# Patient Record
Sex: FEMALE | Race: BLACK OR AFRICAN AMERICAN | NOT HISPANIC OR LATINO | Employment: UNEMPLOYED | RURAL
[De-identification: names, ages, dates, MRNs, and addresses within clinical notes are randomized per-mention and may not be internally consistent; named-entity substitution may affect disease eponyms.]

---

## 2021-03-31 ENCOUNTER — HOSPITAL ENCOUNTER (OUTPATIENT)
Dept: RADIOLOGY | Facility: HOSPITAL | Age: 61
Discharge: HOME | End: 2021-03-31
Attending: NURSE PRACTITIONER
Payer: COMMERCIAL

## 2021-03-31 DIAGNOSIS — M79.602 LEFT ARM PAIN: ICD-10-CM

## 2021-03-31 DIAGNOSIS — M54.2 CERVICALGIA: ICD-10-CM

## 2021-03-31 PROCEDURE — 72050 X-RAY EXAM NECK SPINE 4/5VWS: CPT | Mod: 26,,, | Performed by: RADIOLOGY

## 2021-03-31 PROCEDURE — 72050 XR CERVICAL SPINE COMPLETE 5 VIEW: ICD-10-PCS | Mod: 26,,, | Performed by: RADIOLOGY

## 2021-03-31 PROCEDURE — 73090 X-RAY EXAM OF FOREARM: CPT | Mod: 26,LT,, | Performed by: RADIOLOGY

## 2021-03-31 PROCEDURE — 73070 X-RAY EXAM OF ELBOW: CPT | Mod: TC,LT

## 2021-03-31 PROCEDURE — 73070 X-RAY EXAM OF ELBOW: CPT | Mod: 26,LT,, | Performed by: RADIOLOGY

## 2021-03-31 PROCEDURE — 72050 X-RAY EXAM NECK SPINE 4/5VWS: CPT | Mod: TC

## 2021-03-31 PROCEDURE — 73070 XR ELBOW 2 VIEWS LEFT: ICD-10-PCS | Mod: 26,LT,, | Performed by: RADIOLOGY

## 2021-03-31 PROCEDURE — 73090 XR FOREARM LEFT: ICD-10-PCS | Mod: 26,LT,, | Performed by: RADIOLOGY

## 2021-03-31 PROCEDURE — 73090 X-RAY EXAM OF FOREARM: CPT | Mod: TC,LT

## 2023-06-28 ENCOUNTER — HOSPITAL ENCOUNTER (OUTPATIENT)
Facility: HOSPITAL | Age: 63
Discharge: HOME | End: 2023-06-28
Attending: ANESTHESIOLOGY | Admitting: ANESTHESIOLOGY
Payer: MEDICARE

## 2023-06-28 ENCOUNTER — ANESTHESIA (OUTPATIENT)
Dept: PAIN MEDICINE | Facility: HOSPITAL | Age: 63
End: 2023-06-28
Payer: MEDICARE

## 2023-06-28 ENCOUNTER — ANESTHESIA EVENT (OUTPATIENT)
Dept: PAIN MEDICINE | Facility: HOSPITAL | Age: 63
End: 2023-06-28
Payer: MEDICARE

## 2023-06-28 VITALS
BODY MASS INDEX: 35.68 KG/M2 | DIASTOLIC BLOOD PRESSURE: 79 MMHG | SYSTOLIC BLOOD PRESSURE: 112 MMHG | OXYGEN SATURATION: 100 % | WEIGHT: 209 LBS | TEMPERATURE: 98 F | HEART RATE: 74 BPM | RESPIRATION RATE: 14 BRPM | HEIGHT: 64 IN

## 2023-06-28 DIAGNOSIS — M54.16 LUMBAR RADICULOPATHY, CHRONIC: ICD-10-CM

## 2023-06-28 LAB — GLUCOSE SERPL-MCNC: 171 MG/DL (ref 70–105)

## 2023-06-28 PROCEDURE — 25500020 PHARM REV CODE 255: Performed by: ANESTHESIOLOGY

## 2023-06-28 PROCEDURE — 27000284 HC CANNULA NASAL: Performed by: NURSE ANESTHETIST, CERTIFIED REGISTERED

## 2023-06-28 PROCEDURE — D9220A PRA ANESTHESIA: Mod: ,,, | Performed by: NURSE ANESTHETIST, CERTIFIED REGISTERED

## 2023-06-28 PROCEDURE — 64483 NJX AA&/STRD TFRM EPI L/S 1: CPT | Mod: 50 | Performed by: ANESTHESIOLOGY

## 2023-06-28 PROCEDURE — 25000003 PHARM REV CODE 250: Performed by: NURSE ANESTHETIST, CERTIFIED REGISTERED

## 2023-06-28 PROCEDURE — D9220A PRA ANESTHESIA: ICD-10-PCS | Mod: ,,, | Performed by: NURSE ANESTHETIST, CERTIFIED REGISTERED

## 2023-06-28 PROCEDURE — 37000008 HC ANESTHESIA 1ST 15 MINUTES: Performed by: ANESTHESIOLOGY

## 2023-06-28 PROCEDURE — 82962 GLUCOSE BLOOD TEST: CPT

## 2023-06-28 PROCEDURE — 25000003 PHARM REV CODE 250: Performed by: ANESTHESIOLOGY

## 2023-06-28 PROCEDURE — 63600175 PHARM REV CODE 636 W HCPCS: Performed by: NURSE ANESTHETIST, CERTIFIED REGISTERED

## 2023-06-28 PROCEDURE — 63600175 PHARM REV CODE 636 W HCPCS: Performed by: ANESTHESIOLOGY

## 2023-06-28 RX ORDER — DICYCLOMINE HYDROCHLORIDE 10 MG/1
10 CAPSULE ORAL DAILY PRN
COMMUNITY

## 2023-06-28 RX ORDER — LEVOTHYROXINE SODIUM 50 UG/1
TABLET ORAL
COMMUNITY
Start: 2023-05-08

## 2023-06-28 RX ORDER — BUPIVACAINE HYDROCHLORIDE 2.5 MG/ML
INJECTION, SOLUTION INFILTRATION; PERINEURAL CODE/TRAUMA/SEDATION MEDICATION
Status: DISCONTINUED | OUTPATIENT
Start: 2023-06-28 | End: 2023-06-28 | Stop reason: HOSPADM

## 2023-06-28 RX ORDER — PROPOFOL 10 MG/ML
VIAL (ML) INTRAVENOUS
Status: DISCONTINUED | OUTPATIENT
Start: 2023-06-28 | End: 2023-06-28

## 2023-06-28 RX ORDER — SODIUM CHLORIDE 9 MG/ML
500 INJECTION, SOLUTION INTRAVENOUS CONTINUOUS
Status: DISCONTINUED | OUTPATIENT
Start: 2023-06-28 | End: 2023-06-28 | Stop reason: HOSPADM

## 2023-06-28 RX ORDER — GABAPENTIN 300 MG/1
300 CAPSULE ORAL 2 TIMES DAILY
COMMUNITY
Start: 2023-05-08

## 2023-06-28 RX ORDER — METHOTREXATE 2.5 MG/1
TABLET ORAL
COMMUNITY
Start: 2023-04-12

## 2023-06-28 RX ORDER — LIDOCAINE HYDROCHLORIDE 20 MG/ML
INJECTION, SOLUTION EPIDURAL; INFILTRATION; INTRACAUDAL; PERINEURAL
Status: DISCONTINUED | OUTPATIENT
Start: 2023-06-28 | End: 2023-06-28

## 2023-06-28 RX ORDER — RISPERIDONE 4 MG/1
TABLET ORAL
COMMUNITY
Start: 2023-06-21

## 2023-06-28 RX ORDER — CYCLOBENZAPRINE HCL 10 MG
TABLET ORAL
COMMUNITY
Start: 2023-06-16

## 2023-06-28 RX ORDER — METOPROLOL TARTRATE 25 MG/1
TABLET, FILM COATED ORAL
COMMUNITY
Start: 2023-05-08

## 2023-06-28 RX ORDER — GLIPIZIDE 10 MG/1
TABLET, FILM COATED, EXTENDED RELEASE ORAL
COMMUNITY
Start: 2023-05-08

## 2023-06-28 RX ORDER — CITALOPRAM 20 MG/1
TABLET, FILM COATED ORAL
COMMUNITY
Start: 2023-06-21

## 2023-06-28 RX ORDER — IOPAMIDOL 612 MG/ML
INJECTION, SOLUTION INTRATHECAL CODE/TRAUMA/SEDATION MEDICATION
Status: DISCONTINUED | OUTPATIENT
Start: 2023-06-28 | End: 2023-06-28 | Stop reason: HOSPADM

## 2023-06-28 RX ORDER — MELOXICAM 15 MG/1
15 TABLET ORAL
COMMUNITY
Start: 2023-06-16

## 2023-06-28 RX ORDER — HYDROCODONE BITARTRATE AND ACETAMINOPHEN 10; 325 MG/1; MG/1
TABLET ORAL
COMMUNITY
Start: 2023-06-16

## 2023-06-28 RX ORDER — SITAGLIPTIN 50 MG/1
TABLET, FILM COATED ORAL
COMMUNITY
Start: 2023-05-08

## 2023-06-28 RX ORDER — METFORMIN HYDROCHLORIDE 1000 MG/1
TABLET ORAL
COMMUNITY
Start: 2023-05-08

## 2023-06-28 RX ORDER — POTASSIUM CHLORIDE 750 MG/1
CAPSULE, EXTENDED RELEASE ORAL
COMMUNITY
Start: 2023-05-08

## 2023-06-28 RX ORDER — AMLODIPINE BESYLATE 5 MG/1
TABLET ORAL
COMMUNITY
Start: 2023-05-08

## 2023-06-28 RX ORDER — CYCLOSPORINE 0.5 MG/ML
1 EMULSION OPHTHALMIC 2 TIMES DAILY
COMMUNITY
Start: 2023-05-10

## 2023-06-28 RX ORDER — TRIAMCINOLONE ACETONIDE 40 MG/ML
INJECTION, SUSPENSION INTRA-ARTICULAR; INTRAMUSCULAR CODE/TRAUMA/SEDATION MEDICATION
Status: DISCONTINUED | OUTPATIENT
Start: 2023-06-28 | End: 2023-06-28 | Stop reason: HOSPADM

## 2023-06-28 RX ADMIN — PROPOFOL 50 MG: 10 INJECTION, EMULSION INTRAVENOUS at 02:06

## 2023-06-28 RX ADMIN — SODIUM CHLORIDE: 9 INJECTION, SOLUTION INTRAVENOUS at 01:06

## 2023-06-28 RX ADMIN — PROPOFOL 50 MG: 10 INJECTION, EMULSION INTRAVENOUS at 01:06

## 2023-06-28 RX ADMIN — LIDOCAINE HYDROCHLORIDE 50 MG: 20 INJECTION, SOLUTION INTRAVENOUS at 01:06

## 2023-06-28 NOTE — ANESTHESIA PREPROCEDURE EVALUATION
06/28/2023  Venita Regalado is a 62 y.o., female.      Pre-op Assessment    I have reviewed the Patient Summary Reports.     I have reviewed the Nursing Notes. I have reviewed the NPO Status.   I have reviewed the Medications.     Review of Systems  Anesthesia Hx:  No problems with previous Anesthesia        Physical Exam  General: Well nourished, Cooperative, Alert and Oriented    Airway:  Mallampati: II   Mouth Opening: Normal  TM Distance: Normal  Tongue: Normal  Neck ROM: Normal ROM    Dental:  Intact        Anesthesia Plan  Type of Anesthesia, risks & benefits discussed:    Anesthesia Type: Gen Natural Airway, MAC  Intra-op Monitoring Plan: Standard ASA Monitors  Post Op Pain Control Plan: multimodal analgesia  Induction:  IV  Informed Consent: Informed consent signed with the Patient and all parties understand the risks and agree with anesthesia plan.  All questions answered. Patient consented to blood products? Yes  ASA Score: 3  Day of Surgery Review of History & Physical: I have interviewed and examined the patient. I have reviewed the patient's H&P dated: There are no significant changes.     Ready For Surgery From Anesthesia Perspective.     .   Past Medical History:   Diagnosis Date    Arthritis     Atrial fibrillation     Diabetes mellitus     GERD (gastroesophageal reflux disease)     Hypertension     Thyroid disease        History reviewed. No pertinent surgical history.    Family History   Problem Relation Age of Onset    Stroke Mother     Heart disease Father        Social History     Socioeconomic History    Marital status: Single   Tobacco Use    Smoking status: Never    Smokeless tobacco: Never   Substance and Sexual Activity    Alcohol use: Not Currently       Current Facility-Administered Medications   Medication Dose Route Frequency Provider Last Rate Last Admin    0.9%   NaCl infusion  500 mL Intravenous Continuous Erasmo Peralta MD           Review of patient's allergies indicates:  No Known Allergies There is no problem list on file for this patient.

## 2023-06-28 NOTE — DISCHARGE SUMMARY
Patient underwent Bilateral L4-L5 Transforaminal Epidural Steroid Injection under Fluoroscopic Guidance  procedure /2023. The pt will follow up in clinic. Discharged home. Discharge Dx:  Lumbar Radiculopathy  
SHORTNESS OF BREATH/CHEST PAIN

## 2023-06-28 NOTE — H&P
No changes from original H & P note dated 06/21/2023 and has been reviewed and is valid. See scanned note.

## 2023-06-28 NOTE — OP NOTE
06/28/2023  PREOPERATIVE DIAGNOSIS:     Lumbar Radiculopathy         POSTOPERATIVE DIAGNOSIS:   Lumbar Radiculopathy         PROCEDURE:  Bilateral L4-L5 Transforaminal Epidural Steroid Injection under Fluoroscopic Guidance         SURGEON: Dr. Erasmo Peralta    COMPLICATIONS:  None                                          DRAINS AND PACKS: None    BLOOD LOSS: None                                                               ANESTHESIA:  MAC         The patient was identified in the holding area.  The risks and benefits of the procedure were again explained to the patient.  The patient agreed to proceed.  The back was marked with a skin pen.  The patient was taken in stable condition to the operating room where the patient was placed in prone position on the C-Arm table.    All pressure points were checked and padded comfortably while the patient was awake.  Standard ASA monitors applied.  Time out was completed.  The back was prepped and draped in the usual sterile fashion. The C-Arm was brought into true AP position to identify the vertebral bodies from L1 to over the L4-L5 level on the left side.  The skin wheal  was raised over the targeted area with Bupivacaine 0.25% (2.5mg/ml) 1 cc.  A 22 gauge 3 ½ inch needle was advanced under direct fluoroscopic guidance to the superior aspect of the neural foramen and was confirmed in both AP and lateral views.  A 1 milliliter allotment Isovue M 300 contrast showed excellent delineation along the L4 nerve root with some spread going medially into the epidural space.  The patient received  [id number]/ml  1ml mixed with 2 milliliters of 0.25% Bupivacaine was injected.  Stylet was removed.  There was negative aspiration.  There was adequate hemostasis at the conclusion of the procedure.  The procedure was repeated on the right as described above. The patient tolerated the procedure well with no adverse event and no complications.  The patient was discharged home under the  care of the patients .  The patients preoperative score 10/10.  The patients postoperative pain score was /10.

## 2023-06-28 NOTE — TRANSFER OF CARE
"Anesthesia Transfer of Care Note    Patient: Venita Regalado    Procedure(s) Performed: Procedure(s) (LRB):  INJECTION, STEROID, EPIDURAL, TRANSFORAMINAL APPROACH (Bilateral)    Patient location: Other:    Anesthesia Type: general    Transport from OR: Transported from OR on room air with adequate spontaneous ventilation    Post pain: adequate analgesia    Post assessment: no apparent anesthetic complications    Post vital signs: stable    Level of consciousness: responds to stimulation    Nausea/Vomiting: no nausea/vomiting    Complications: none    Transfer of care protocol was followed      Last vitals:   Visit Vitals  /80   Pulse 80   Temp 36.8 °C (98.2 °F)   Resp 15   Ht 5' 4" (1.626 m)   Wt 94.8 kg (209 lb)   SpO2 97%   BMI 35.87 kg/m²     "

## 2023-06-28 NOTE — DISCHARGE INSTRUCTIONS
REFER TO WRITTEN DOCUMENT AND RECOVERY INFORMATION.        INFORMED PATIENT IF UNABLE TO VOID IN 8 HOURS, GO TO ER. NOTIFY MD OF REDNESS OR DRAINAGE FROM INJECTION SITE OR FEVER OVER 3-4 DAY. REST AND DRINK PLENTY OF FLUIDS FOR THE REMAINDER OF THE DAY. NO LIFTING OVER 5 LBS FOR THE REMAINDER OF THE DAY. CONTINUE REGULAR MEDICATIONS AS PRESCRIBED. MAY TAKE PAIN MEDICATION AS PRESCRIBED.        well developed

## 2023-06-29 NOTE — ANESTHESIA POSTPROCEDURE EVALUATION
Anesthesia Post Evaluation    Patient: Venita Regalado    Procedure(s) Performed: Procedure(s) (LRB):  INJECTION, STEROID, EPIDURAL, TRANSFORAMINAL APPROACH (Bilateral)    Final Anesthesia Type: general      Patient location: [facility].  Patient participation: Yes- Able to Participate  Level of consciousness: awake and alert  Post-procedure vital signs: reviewed and stable  Pain management: adequate  Airway patency: patent    PONV status at discharge: No PONV  Anesthetic complications: no      Cardiovascular status: blood pressure returned to baseline, hemodynamically stable and stable  Respiratory status: unassisted  Hydration status: euvolemic  Follow-up not needed.  Comments: Pt voices appreciation for care          Vitals Value Taken Time   /77 06/28/23 1444   Temp 36.8 °C (98.2 °F) 06/28/23 1411   Pulse 76 06/28/23 1445   Resp 12 06/28/23 1445   SpO2 100 % 06/28/23 1445   Vitals shown include unvalidated device data.      Event Time   Out of Recovery 14:41:00         Pain/Peter Score: Peter Score: 10 (6/28/2023  2:40 PM)

## 2024-02-19 NOTE — PLAN OF CARE
REFER TO WRITTEN DOCUMENT AND RECOVERY INFORMATION.    D/CD PATIENT [facility]AA WHEELCHAIR AT 1500.    INFORMED PATIENT IF UNABLE TO VOID IN 8 HOURS, GO TO ER. NOTIFY MD OF REDNESS OR DRAINAGE FROM INJECTION SITE OR FEVER OVER 3-4 DAY. REST AND DRINK PLENTY OF FLUIDS FOR THE REMAINDER OF THE DAY. NO LIFTING OVER 5 LBS FOR THE REMAINDER OF THE DAY. CONTINUE REGULAR MEDICATIONS AS PRESCRIBED. MAY TAKE PAIN MEDICATION AS PRESCRIBED.     PAIN IMPROVED  100%    catheter advanced into LV.

## 2024-10-02 ENCOUNTER — ANESTHESIA EVENT (OUTPATIENT)
Dept: PAIN MEDICINE | Facility: HOSPITAL | Age: 64
End: 2024-10-02
Payer: MEDICARE

## 2024-10-02 ENCOUNTER — ANESTHESIA (OUTPATIENT)
Dept: PAIN MEDICINE | Facility: HOSPITAL | Age: 64
End: 2024-10-02
Payer: MEDICARE

## 2024-10-02 ENCOUNTER — HOSPITAL ENCOUNTER (OUTPATIENT)
Facility: HOSPITAL | Age: 64
Discharge: HOME | End: 2024-10-02
Attending: ANESTHESIOLOGY | Admitting: ANESTHESIOLOGY
Payer: MEDICARE

## 2024-10-02 VITALS
OXYGEN SATURATION: 97 % | HEART RATE: 79 BPM | WEIGHT: 194.38 LBS | HEIGHT: 64 IN | BODY MASS INDEX: 33.19 KG/M2 | SYSTOLIC BLOOD PRESSURE: 141 MMHG | RESPIRATION RATE: 13 BRPM | DIASTOLIC BLOOD PRESSURE: 78 MMHG | TEMPERATURE: 99 F

## 2024-10-02 DIAGNOSIS — M54.16 LUMBAR RADICULOPATHY, CHRONIC: ICD-10-CM

## 2024-10-02 LAB — GLUCOSE SERPL-MCNC: 101 MG/DL (ref 70–105)

## 2024-10-02 PROCEDURE — 64483 NJX AA&/STRD TFRM EPI L/S 1: CPT | Mod: 50 | Performed by: ANESTHESIOLOGY

## 2024-10-02 PROCEDURE — 25000003 PHARM REV CODE 250: Performed by: ANESTHESIOLOGY

## 2024-10-02 PROCEDURE — 63600175 PHARM REV CODE 636 W HCPCS: Performed by: ANESTHESIOLOGY

## 2024-10-02 PROCEDURE — 27000716 HC OXISENSOR PROBE, ANY SIZE

## 2024-10-02 PROCEDURE — 25500020 PHARM REV CODE 255: Performed by: ANESTHESIOLOGY

## 2024-10-02 PROCEDURE — 63600175 PHARM REV CODE 636 W HCPCS

## 2024-10-02 PROCEDURE — 27000284 HC CANNULA NASAL

## 2024-10-02 PROCEDURE — 37000008 HC ANESTHESIA 1ST 15 MINUTES: Performed by: ANESTHESIOLOGY

## 2024-10-02 PROCEDURE — 82962 GLUCOSE BLOOD TEST: CPT

## 2024-10-02 RX ORDER — IOPAMIDOL 612 MG/ML
INJECTION, SOLUTION INTRATHECAL CODE/TRAUMA/SEDATION MEDICATION
Status: DISCONTINUED | OUTPATIENT
Start: 2024-10-02 | End: 2024-10-02 | Stop reason: HOSPADM

## 2024-10-02 RX ORDER — LIDOCAINE HYDROCHLORIDE 20 MG/ML
INJECTION INTRAVENOUS
Status: DISCONTINUED | OUTPATIENT
Start: 2024-10-02 | End: 2024-10-02

## 2024-10-02 RX ORDER — TRIAMCINOLONE ACETONIDE 40 MG/ML
INJECTION, SUSPENSION INTRA-ARTICULAR; INTRAMUSCULAR CODE/TRAUMA/SEDATION MEDICATION
Status: DISCONTINUED | OUTPATIENT
Start: 2024-10-02 | End: 2024-10-02 | Stop reason: HOSPADM

## 2024-10-02 RX ORDER — SODIUM CHLORIDE 9 MG/ML
500 INJECTION, SOLUTION INTRAVENOUS CONTINUOUS
Status: DISCONTINUED | OUTPATIENT
Start: 2024-10-02 | End: 2024-10-02 | Stop reason: HOSPADM

## 2024-10-02 RX ORDER — BUPIVACAINE HYDROCHLORIDE 2.5 MG/ML
INJECTION, SOLUTION INFILTRATION; PERINEURAL CODE/TRAUMA/SEDATION MEDICATION
Status: DISCONTINUED | OUTPATIENT
Start: 2024-10-02 | End: 2024-10-02 | Stop reason: HOSPADM

## 2024-10-02 RX ORDER — PROPOFOL 10 MG/ML
VIAL (ML) INTRAVENOUS
Status: DISCONTINUED | OUTPATIENT
Start: 2024-10-02 | End: 2024-10-02

## 2024-10-02 RX ADMIN — PROPOFOL 20 MG: 10 INJECTION, EMULSION INTRAVENOUS at 11:10

## 2024-10-02 RX ADMIN — PROPOFOL 100 MG: 10 INJECTION, EMULSION INTRAVENOUS at 11:10

## 2024-10-02 RX ADMIN — PROPOFOL 40 MG: 10 INJECTION, EMULSION INTRAVENOUS at 11:10

## 2024-10-02 RX ADMIN — LIDOCAINE HYDROCHLORIDE 100 MG: 20 INJECTION, SOLUTION INTRAVENOUS at 11:10

## 2024-10-02 RX ADMIN — SODIUM CHLORIDE: 9 INJECTION, SOLUTION INTRAVENOUS at 11:10

## 2024-10-02 NOTE — OP NOTE
10/02/2024  Venita Regalado 1960      PREOPERATIVE DIAGNOSIS:     Lumbar Radiculopathy     POSTOPERATIVE DIAGNOSIS:   Lumbar Radiculopathy     PROCEDURE:  Bilateral L4-L5 Transforaminal Epidural Steroid Injection under Fluoroscopic Guidance     SURGEON: Dr. Erasmo Peralta  COMPLICATIONS:  None                                        DRAINS AND PACKS: None  BLOOD LOSS: None                                                             ANESTHESIA:  MAC     The patient was identified in the holding area.  The risks and benefits of the procedure were again explained to the patient.  The patient agreed to proceed.  The back was marked with a skin pen.  The patient was taken in stable condition to the operating room where the patient was placed in prone position on the C-Arm table.    All pressure points were checked and padded comfortably while the patient was awake.  Standard ASA monitors applied.  Time out was completed.  The back was prepped and draped in the usual sterile fashion. The C-Arm was brought into true AP position to identify the vertebral bodies from L1 to over the L4-L5 level on the left side.  The skin wheal  was raised over the targeted area with Bupivacaine 0.25% (2.5mg/ml) 1 cc.  A 22 gauge 3 ½ inch needle was advanced under direct fluoroscopic guidance to the superior aspect of the neural foramen and was confirmed in both AP and lateral views.  A 1 milliliter allotment of Isovue M 300 contrast showed excellent delineation along the L4 nerve root with some spread going medially into the epidural space.  The patient received  [id number]/ml 1/2 ml mixed with 2 milliliters of 0.25% Bupivacaine was injected.  Stylet was removed.  There was negative aspiration.  There was adequate hemostasis at the conclusion of the procedure.  The procedure was repeated on the right as described above. The patient tolerated the procedure well with no adverse event and no complications.  The patient was discharged  home under the care of the patients .  The patients preoperative score 8/10.  The patients postoperative pain score was /10.

## 2024-10-02 NOTE — H&P
"[facility] - Pain Management  Pain Management  H&P    Patient Name: Venita Regalado  MRN: [id number]  Admission Date: 10/2/2024  Primary Care Provider: Albertina Bermudez FNP    Patient information was obtained from     Subjective:     Principal Problem:     Albertina Bermudez FNP  [address]  [place] [address]  [phone]                   RE: Venita Regalado      : 1960   Date of Service: 2024   Existing Patient           Chief Complaint:   Hip pain left; characterized as dull pain , aching aggravated by walking standing ; relieved by rest, narcotics; gradual in onset, constant.   Patient seated in 7 with c/o left hip and low back pain, reports pain is unchanged since last visit      History of Present Illness:   What part of the body? left hip and low back   Pain level at best 1; Pain level at worst 7; Pain level at present 2; Pain level on average 4   65 y/o with complaints of "low back pain"; objective data essentially unchanged from previous visit; she has missed her last appt due to miscommunication issues and has been out of meds a few days; she is now having more R side neck pain that radiates into R hand with numbness and tingling and is also having more L side back pain that radiates into L leg with numbness and tingling; states that pain improved by 100% after Bilateral L5-S1 TFESI #3 in Nov that lasted for several months but has started having some slight pain to Left lower back that radiates into hip and leg;  overall, pain has been worse since last visit; denies any recent falls; also states that pain to her Left side that radiates into leg with numbness and tingling has also improved with injection; she was able to have her last Bilateral L4-L5 TFESI #2 that improved pain by 95% that lasted for about 4 months and is ready to schedule her next injection; she did have some elevated blood sugar after this last injection going to 500 that resulted in an ER visit and " had a med change but has normalized at this point; she has been diagnosed with bone spurs and was seen by ortho and was able to wear a boot that allowed foot to heal; states that pain improved by 100% after Bilateral L4-L5 TFESI #1 in Nov 2022 and is still having good pain relief; overall, she is doing good with pain control; she has not had an appt with Dr. Juarez in [place]  for knee pain and had IA injections to both knees since last visit but this is helping better with pain control but needs to reschedule an additional appt; states that pain improved by 90% after cath guided LACHELLE C5-C6 #1 that is still doing ok with pain control; neck pain has been better along with the numbness to both arms; states that neck pain has been better since procedure as well as the tingling to L arm; she was able to have her Cspine MRI that revealed changes; states that pain improved by 100% after Bilateral L4-L5 TFESI #1 in April 2021 that lasted for over a year with pain relief and needs an additional injection scheduled; heat helps with this pain; states that pain improved by 100% after her Left L4-S1 TFESI #1 in Dec 2019; pain only improved by 30% at this point after her Left SI joint RFTC; the TFESI also helped with lower back pain as well as L leg pain; states that pain to lower back that radiates into L leg with numbness and tingling has improved with previous injections but has been over a year since she has had one done; she has had surgery on the R knee in the past; states that pain improved by 100% after her Left SI joint RFTC in Nov 2018 that improved pain; states that pain improved by 100 % after Left SI injection #2 for a about a week then the pain slowly returned. She had about 90% after her Left SI joint injection  #1 that lasted several weeks. overall, pain is controlled with meds and rest; States that pain improved 100% after her Left L3-L5 RFTC in April 2018 and is still doing good at this point; states that  she had 100% improvement of pain after her #1 and #2 Left L3-S1 that lasted about a month; states that pain is unchanged from previous visit and states that meds help with her pain; feels like R knee pain is worse from previous visit and she is having more joint pain and had the Bilateral Knee xrays that revealed some changes but she has been seen by Ortho in [place]; she has had knee injections in the past that helps with knee pain but has also had a scope in the past as well by [facility]; She defers any change in treatment plan at this time; denies any issue with constipation; procedures in the past helped with her pain; she is a diabetic but blood sugars remain in the 90's and low 100's--in the past she has had injections without much elevation     UDS: consistent x 21; inconsistent x 6  (late picking up rx) UDS due today  The previous urine drug screen was evaluated, and it was compliant for the medications that has been prescribed. A presumptive urine drug screen was done today to rapidly obtain and integrate results into clinical assessment and decision-making for ongoing safe prescribing of controlled substances. The results of the presumptive UDS done last visit. She is prescribed hydrocodone. Because presumptive UDS positive results are not definitive due to sensitivity and specificity and cross reactivity limitations and negative results do not necessarily indicate absence of drugs or substances in the urine specimen, confirmation will identify specific prescribed and non-prescribed medications or illicit use for ongoing safe prescribing of controlled substances including benzodiazepines, opioid agonist, opioids antagonist, partial agonist, stimulants, muscle relaxers, antidepressants, sleep aids, anti-seizure medicine, and alcohol. Urine drug analysis is used to assist with diagnosis and therapeutic decision-making concerning pretreatment assessment. Intensity and frequency of monitoring with  urine drug testing will be based on the risk stratification method in determining risk level for opioid addiction.     Meds: Norco--due today; requests meds to be refilled and states that meds help with pain; no misuse of meds and no opioid abuse;  reviewed and is appropriate; CDC guidelines discussed with pt and voiced understanding; she is currently prescribed 30 mg of morphine equivalent meds/day but does not take every day      Nursing:   Pain Medication/Dose/Last Taken/# Taken  Norco 10-325mg  pain level with medication is a 2/10 and without is a 10/10  no Physical Therapy  Home Exercises  no New medical problems or surgeries  no New medications  no New allergies  no New antibiotics, fever, infection      Allergies:   No Known Allergies Confirmed - 09/19/24 10:55:11 AM CST       Current Medications:   Medications List Reviewed (09/19/24 10:55:08 AM CST)  HYDROcodone-Acetaminophen Oral Tablet  MG (9/19/2024) Take 1 tablet four times a day as needed for 30 day(s)  Dexamethasone Sodium Phosphate Injection Solution 4 MG/ML (5/28/2024) From 05/28/2024 10:30 AM to 05/28/2024 10:45 AM  Dexamethasone Sodium Phosphate Injection Solution 100 MG/10ML (9/11/2019) From 09/11/2019 11:25 AM to 09/11/2019 11:40 AM  Ketorolac Tromethamine Injection Solution 60 MG/2ML (5/28/2024) From 05/28/2024 10:30 AM to 05/28/2024 10:45 AM  Dexamethasone Sodium Phosphate Injection Solution 10 MG/ML (12/20/2018) From 12/20/2018 12:07 PM  Medrol Oral Tablet Therapy Pack 4 MG (12/7/2018) Take as directed  RaNITidine HCl Oral Tablet 150 MG (8/14/2018) Take 1 tablet once a day  Simvastatin Oral Tablet 40 MG (3/14/2017)  MetFORMIN HCl Oral Tablet 500 MG (3/14/2017)  Levothyroxine Sodium Oral Tablet 75 MCG (3/14/2017)  Ferrous Fumarate Oral Tablet 325 (106 Fe) MG (3/14/2017)  Benicar Oral Tablet 40 MG (3/14/2017)  Cyclobenzaprine HCl Oral Tablet 10 MG (5/28/2024) Take 1 tablet three times a day as needed for 30 day(s)      Previous  Studies:  MRI MRI R knee at [facility]    Impression:    1. Horizontal tear of tne body of lateral meniscus with an 11x7 mm adjacent parameniscal cyst. Reactive soft tissue edema at the lateral knee    2. mild tricompartmental DJD, slightly more prominent at the lateral femorotibial and patellofemoral compartments    3. A 1 cm intra-articular body along jacquelin posterior margin of the distal posterior cuciate ligament    4. small knee joint effusion with synovitis                [facility]spine [facility]    10/15/2019    Impression:    1. Moderate bilateral subarticular stenosis at L4-L5 with mild impingement on traversing bilateral L5 nerve roots. This is slightly progressed from comparison exam    2. Central annular fissure at L4-L5, similar to prior    3. Additional multilevel degenerative changes of the lumber spine as described in detail above, predominantly facet degenerative changes        MRI Cspine at [facility]    2021    Impression:    minimal left foraminal spondylosisof C5-C6 producing mild left lateral recess/foraminal narrowing                     ; X-RAY  Final Report  CR CR KNEE BILATERAL 1 OR 2 VIEWS    Show Printer-Friendly Version Patient Name: Venita Regalado   :    ID: [id number]([facility])   Study Date:  11:49             2 views of both knees obtained History is chronic bilateral knee pain There are miniscule osteophytes bilaterally more pronounced on the right No acute fracture or aggressive periosteal reaction is seen There is a 3 mm calcification overlying the region of the origin of the MCL in the right knee. This could be related to remote MCL injury area There are 2 calcifications which are chronic and corticated posterior to right knee measuring up to 10 mm which are present on the prior study. One of these is the flabella are regular could be a loose body. Impression- Minimal osteophytes bilaterally with a  possible loose body in the right knee Place of service- [facility] This report has been electronically signed by Cherri Montes - KAYLEE Logan PhysicianHaydee MONTES M.D. Releasing Physician- CHERRI MONTES M.D. Released Date Time- 18 1249 ------------------------------------------------------------------------------    Xray Cspine and left F/A at [facility] 2021  Impression:  no acute osseous pathology on l arm  and normal cervical spine radiographs        Past Medical History:   The patient has a past medical history of  Hypertension, Diabetes Mellitus type 2, Atrial Fibrillation, Depression, Obesity, Anxiety, Osteoarthritis (OA), Arthritis, Thyroid Disease.  There is no past medical history of  Congestive Heart Failure, Cardiovascular Disease, Terminal Illness.   nutritional quality good      Surgical History:      No prior surgical history      Social History:      Smoking Status: Never smoker; Last Reviewed: 2024                        Alcohol use: Non-Drinker  Racial background:   Occupation: disabled  Marital status: Single  Patient knows the purpose/use of medications as of 10/17/2017  Patient is taking medications as prescribed as of 10/17/2017               Family History:   Father  History remarkable for  Coronary Artery Disease.   Age 67;       Mother  History remarkable for  Cerebrovascular Accident/Transient Ischemic Attack.   Age 68;       Review of Systems:   General:  Patient denies  sweats, fatigue, fever, chills.  Ears, Nose and Throat:  Patient denies  hearing loss, ringing in the ears.  Cardiovascular:  Patient denies  chest pain.  Respiratory:  Patient denies  shortness of breath.  Gastrointestinal:  Patient denies  nausea, vomiting, diarrhea, constipation.  Genitourinary:  Patient denies  urinary frequency.  Endocrine:  Patient denies  thyroid problems.  Hematologic:  Patient denies  bleeding tendencies, easy bruising  tendency.  Musculoskeletal:   Patient admits to   joint pain.  Patient denies  walking aids.  Neurologic  Patient denies  seizures, headache.  Psychologic:   Patient admits to   depression.  Patient denies  anxiety, panic attacks.  Currently managed by [facility] in [place] for depression.  Skin:  Patient denies  skin rash.       DEPRESSION SCREENING:   Not at all the patient reports little interest or pleasure in doing things.  Not at all the patient reports feeling down, depressed, or hopeless.  Date Depression Screening Last Done: 01/09/2020   PHQ-2 Score: 0; PHQ-9 Score: incomplete   Date Depression Screening Last Done: 08/13/2019      Vital Signs:   Weight 215 lbs; Height 5 ft 4 in; BMI 36.9   09/19/2024 10:51 AM (CST)  Temperature 97.9 °F; Respiration Rate 18; Pulse Rate 81 bpm; Blood Pressure 108 / 71 mm/Hg; Pain Level: 10         Physical Examination:   Pre Anesthesia evaluation  Pre Op dx: cervical radiculopathy  Planned procedure: C5-C6 cath guided LACHELLE #1  Age: 61  Ht: 5 ft 4 in  Wt: 208 lbs  BMI:  Allergies: NKDA  Meds/Labs/Test  Prior Surgeries: B carpal tunnel, R rotator cuff, R knee arthroscopy, cholecystectomy, hysterectomy  Anethesia complications: NONE     Medical History  CNS: __Neg.   __Seizures  __CVA  __TIA  __HA  _X_Depression  Cardiac: __Neg  __CAD  __Stents  __MI  _X_HTN  __CHF __A Fib  Pulmonary: _X_Neg  __COPD  __Asthma  __Sleep Apnea  __Smoker PPD  __CPAP  GI:__Neg.  _X_Reflux  __Liver Dysfunction  __Hepatitis  __Hiatal Hernia  __Hepatitis  __ETOH  _X_GERD   Renal:  _X_Neg.  __CRI  __ESRD  Endocrine:  __Neg.  __Thyroid  _X_Diabetes  Heme:  _X_Neg.   __Blood thinners  __other     Cranial Nerves II-XII grossly intact.  No apparent distress.  Patient is alert and oriented to person, place, and time.  No somnolence or slurred speech.   Back Motion:   Lumbar / lumbosacral spine abnormal.      Tenderness on Palpation:   Lumbosacral Spine:  There is tenderness on palpation of the   left sciatic notch moderate in nature.         Additional Physical Findings:  General general appearance normal,   Oriented to time, place and person, pleasant, seated  Not uncomfortable  Head normal head exam  Eyes normal eye exam  Chest normal chest exam  Respiratory normal respiratory exam  Musculoskeletal abnormal,   Tenderness on palpation, muscle spasm, Knees abnormal, Feet abnormal, joint tenderness  R knee pain and swelling  Neurologic normal neurologic exam  Skin normal skin exam       Toxicology Report   Toxicology was performed.   Reason for Toxicology:  A presumptive urine drug screen was done today to rapidly obtain and integrate results into clinical assessment and decision-making for ongoing safe prescribing of controlled substances.   Test Date/Time: 09/19/2024 00:00   Tested By: NURA   Oxycodone  (OXY): Result = Negative; Control = Positive   Morphine  (OPI): Result = Positive; Control = Positive   Amphetamines  (AMP): Result = Negative; Control = Positive   Oxazepam  (BZO): Result = Negative; Control = Positive   Methadone  (MTD): Result = Negative; Control = Positive   Secobarbital  (BAR): Result = Negative; Control = Positive   Tricyclic Antidepressants  (TCA): Result = Negative; Control = Positive   Nortriptyline  (TCA): Result = Negative; Control = Positive   Marijuana-Carboxy Tetrahydrocannabinoid   (THC): Result = Negative; Control = Positive   Cocaine  (ROSALVA): Result = Negative; Control = Positive   Ecstasy-Methylenedioxymethamphetamine  (MDMA): Result = Negative; Control = Positive   D Methamphetamine  (MET): Result = Negative; Control = Positive   Phencyclidine  (PCP): Result = Negative; Control = Positive   Adulterants  (OX, SG, pH): Result = Negative; Control = Positive      Assessment:   (M19.90) - Osteoarthritis  (M25.569) - Knee pain  (M54.5) - Low back pain  (M53.3) - Disorder of sacrum  (M54.16) - Lumbar radiculopathy  (M47.817) - Lumbosacral spondylosis without myelopathy  (M23.359)  - Meniscus, lateral, posterior horn derangement  (M54.2) - Neck pain  (M54.12) - Cervical radiculopathy      Plan:   Follow up visit 6 weeks      -refilled Norco--due today  -gave rx for Oct with hold date 10/18/2024  (pharmacy closed on Sat)   -refilled Flexeril 10 mg TID prn with 5 RF to [facility]  (due again in Dec 2024)   -drink plenty of fluids and water  -increase fiber in diet  -scheduled Bilateral L4-L5 TFESI #1 at [facility] on 10/02/2024 at 10:15 am   -will consider cervical LACHELLE on return visit      Patient has a medical problem of intermediate acuity. The medical condition is not life threatening but poses a significant impact on morbidity and/or impacts the patients activities of daily living. This procedure is medically necessary to reduce pain and improve functionality.        Monitored Anesthesia Care medical necessity authorization request:   Monitor anesthesia request is medically indicated for the scheduled _TFESI_______procedure due to:     - needle phobia and anxiety, placing the patient at risk during the provided service._YES____  - patient has a BMI greater than 45 ____  - patient has severe sleep apnea for which BiPAP and oxygen are needed while sleeping._____  - patient is unable to follow simple commands due to mental state.____  - patient has an ASA class greater than 3 and requires constant presence of an anesthesiologist/CRNA during the procedure.____  - patient has severe problems with muscles and muscle spasticity that makes it hard to lie still. ____  - patient suffers from chronic pain and is unable to function due to diminished ADL's.__YES__  - patient is dependent on opioids or sedatives. _YES__   - Other _YES___    Due to the presence of lumbar radicular symptoms, we have elected to proceed with Lumbar transforaminal epidural steroid injections at the level confirmed by physical examination and accompanying studies. This is medically necessary to improve function, reduce pain  and limitations, and to reduce the reliance on pain medications. Additional epidural steroid injections will be based on patient improvement.     NARCOTIC STATEMENT  Patient is taking the narcotic pain medications as prescribed. Refill is being given because of the benefit to the patient in regards to the pain. Patient has agreed not to abuse of medication and not to take it more than what is prescribed. The nature of the drug including the potential for addiction and dependency and abuse was also discussed with the patient. Patient has developed physical dependency for the narcotic pain medication for his pain relief.  Patient has also developed tolerance to the sedative effect of the narcotic pain medications.  Patient has decided to continue with these medications despite potential for addiction as described by this office.  This was stressed to the patient that it is the patient's responsibility to secure the narcotic medication and in any event of loss for any reason whatsoever,  there will be no refill before the next due date. Patient also understands that they are not supposed to drive or work on machinery while taking these medications.  Also explained to the patient that in the event of traffic citation, the presence of this drug in  bloodstream may result in DUI.  Patient has been advised not to drink alcohol while taking this medication.  Patient has verbalized understanding of our office policy and has signed a contract with us in this regard.      Prescriptions Written Today:  HYDROcodone-Acetaminophen Oral Tablet  MG  Take 1 tablet four times a day as needed for 30 day(s)  Refills: No Refills  Rx quantity: 120  Take 1 tablet four times a day as needed for 30 day(s)  Refills: No Refills  Rx quantity: 120                 Albertina Bermudez       Electronically signed: 9/19/2024 1:13:40 PM      Erasmo Peralta MD      Electronically signed: 9/20/2024 9:06:12 AM           Chief Complaint:      HPI:        Assessment/Plan:         Erasmo Peralta MD  Pain Management  [facility] - Pain Management

## 2024-10-02 NOTE — DISCHARGE SUMMARY
Patient underwent  Bilateral L4-L5 Transforaminal Epidural Steroid Injection under Fluoroscopic Guidance   procedure 10/02/2024. The pt will follow up in clinic. Discharged home. Discharge Dx:Lumbar Radiculopathy

## 2024-10-02 NOTE — ANESTHESIA PREPROCEDURE EVALUATION
10/02/2024  Venita Regalado is a 64 y.o., female.  No current facility-administered medications on file prior to encounter.     Current Outpatient Medications on File Prior to Encounter   Medication Sig Dispense Refill    amLODIPine (NORVASC) 5 MG tablet       citalopram (CELEXA) 20 MG tablet       cyclobenzaprine (FLEXERIL) 10 MG tablet TAKE 1 TABLET THREE TIMES A DAY AS NEEDED FOR 30 DAY(S)      dicyclomine (BENTYL) 10 MG capsule Take 10 mg by mouth daily as needed.      gabapentin (NEURONTIN) 300 MG capsule Take 300 mg by mouth 2 (two) times a day.      glipiZIDE (GLUCOTROL) 10 MG TR24       HYDROcodone-acetaminophen (NORCO)  mg per tablet TAKE ONE TABLET 4 TIMES DAILY AS NEEDED FOR PAIN DNFU 6-16-23      JANUVIA 50 mg Tab       levothyroxine (SYNTHROID) 50 MCG tablet       meloxicam (MOBIC) 15 MG tablet Take 15 mg by mouth.      metFORMIN (GLUCOPHAGE) 1000 MG tablet       methotrexate 2.5 MG Tab       metoprolol tartrate (LOPRESSOR) 25 MG tablet       potassium chloride (MICRO-K) 10 MEQ CpSR       RESTASIS MULTIDOSE 0.05 % Drop Place 1 drop into both eyes 2 (two) times daily.      risperiDONE (RISPERDAL) 4 MG tablet       [DISCONTINUED] olmesartan (BENICAR) 40 MG tablet Take 40 mg by mouth once daily.      [DISCONTINUED] simvastatin (ZOCOR) 40 MG tablet Take 40 mg by mouth every evening.       Active Ambulatory Problems     Diagnosis Date Noted    No Active Ambulatory Problems     Resolved Ambulatory Problems     Diagnosis Date Noted    No Resolved Ambulatory Problems     Past Medical History:   Diagnosis Date    Arthritis     Atrial fibrillation     Diabetes mellitus     GERD (gastroesophageal reflux disease)     Hypertension     Thyroid disease      Past Surgical History:   Procedure Laterality Date    TRANSFORAMINAL EPIDURAL INJECTION OF STEROID Bilateral 6/28/2023    Procedure: INJECTION,  STEROID, EPIDURAL, TRANSFORAMINAL APPROACH;  Surgeon: Erasmo Peralta MD;  Location: [facility];  Service: Pain Management;  Laterality: Bilateral;  Bilateral L4-5 TFESI         Pre-op Assessment    I have reviewed the Patient Summary Reports.     I have reviewed the Nursing Notes. I have reviewed the NPO Status.   I have reviewed the Medications.     Review of Systems  Anesthesia Hx:  No problems with previous Anesthesia             Denies Family Hx of Anesthesia complications.    Denies Personal Hx of Anesthesia complications.                    Social:  Non-Smoker, No Alcohol Use       Hematology/Oncology:  Hematology Normal   Oncology Normal                                   EENT/Dental:  EENT/Dental Normal           Cardiovascular:  Exercise tolerance: good   Hypertension, well controlled    Dysrhythmias (NSR on monitor) atrial fibrillation                                   Pulmonary:  Pulmonary Normal                       Renal/:   renal calculi               Hepatic/GI:     GERD, well controlled             Musculoskeletal:  Arthritis               Neurological:  Neurology Normal                                      Endocrine:  Diabetes, well controlled, type 2 Hypothyroidism        Obesity / BMI > 30  Dermatological:  Skin Normal    Psych:  Psychiatric Normal                    Physical Exam  General: Well nourished, Cooperative, Alert and Oriented    Airway:  Mallampati: II   Mouth Opening: Normal  TM Distance: Normal  Tongue: Normal  Neck ROM: Normal ROM    Dental:  Dentures    Chest/Lungs:  Normal Respiratory Rate        Anesthesia Plan  Type of Anesthesia, risks & benefits discussed:    Anesthesia Type: MAC  Intra-op Monitoring Plan: Standard ASA Monitors  Post Op Pain Control Plan: multimodal analgesia  Induction:  IV  Informed Consent: Informed consent signed with the Patient and all parties understand the risks and agree with anesthesia plan.  All questions answered. Patient consented  to blood products? Yes  ASA Score: 2  Day of Surgery Review of History & Physical: H&P Update referred to the surgeon/provider.I have interviewed and examined the patient. I have reviewed the patient's H&P dated: There are no significant changes.     Ready For Surgery From Anesthesia Perspective.     .

## 2024-10-02 NOTE — TRANSFER OF CARE
"Anesthesia Transfer of Care Note    Patient: Venita Regalado    Procedure(s) Performed: Procedure(s) (LRB):  INJECTION, STEROID, EPIDURAL, TRANSFORAMINAL APPROACH (Bilateral)    Patient location: Other: [facility]    Anesthesia Type: MAC    Transport from OR: Transported from OR on room air with adequate spontaneous ventilation    Post pain: adequate analgesia    Post assessment: no apparent anesthetic complications    Post vital signs: stable    Level of consciousness: sedated and responds to stimulation    Nausea/Vomiting: no nausea/vomiting    Complications: none    Transfer of care protocol was followedComments: Good SV continue, NAD noted, VSS, RTRN      Last vitals: Visit Vitals  /66   Pulse 82   Temp 37.3 °C (99.1 °F) (Oral)   Resp 16   Ht 5' 4" (1.626 m)   Wt 88.2 kg (194 lb 6.4 oz)   SpO2 99%   BMI 33.37 kg/m²     "

## 2024-10-02 NOTE — ANESTHESIA POSTPROCEDURE EVALUATION
Anesthesia Post Evaluation    Patient: Venita Regalado    Procedure(s) Performed: Procedure(s) (LRB):  INJECTION, STEROID, EPIDURAL, TRANSFORAMINAL APPROACH (Bilateral)    Final Anesthesia Type: MAC      Patient location: [facility].  Patient participation: Yes- Able to Participate  Level of consciousness: awake and alert  Post-procedure vital signs: reviewed and stable  Pain management: adequate  Airway patency: patent    PONV status at discharge: No PONV  Anesthetic complications: no      Cardiovascular status: blood pressure returned to baseline, hemodynamically stable and stable  Respiratory status: unassisted  Hydration status: euvolemic  Follow-up not needed.  Comments: Pt voices appreciation for care              Vitals Value Taken Time   /78 10/02/24 1219   Temp 97.8 F 10/02/24 1311   Pulse 79 10/02/24 1219   Resp 13 10/02/24 1219   SpO2 97 % 10/02/24 1219         Event Time   Out of Recovery 12:19:00         Pain/Peter Score: Peter Score: 10 (10/2/2024 12:03 PM)

## 2024-10-02 NOTE — PLAN OF CARE
Plan:  D/c pt via wheelchair at 1230  Informed pt if does not void in 8 hours to go to ER. Notify if redness, drainage, from injection site or fever over next 3-4 days. Rest and drink plenty of fluids for the remainder of the day. No lifting over 5 lbs. For the remainder of the day. Continue regular medications as prescribed. May take pain medications as prescribed.     Pain improved 100%  Pre-procedure pain: 8  Post-procedure pain: 0

## (undated) DEVICE — TRAY NERVE BLOCK UNIV 10/CA

## (undated) DEVICE — KIT IV START

## (undated) DEVICE — APPLICATOR CHLORAPREP ORN 26ML

## (undated) DEVICE — GLOVE SENSICARE PI SURG 6.5

## (undated) DEVICE — NDL SPINAL CLR HUB 22GX4 3/4

## (undated) DEVICE — CATH IV 20G 1.16 IN AUTOGARD

## (undated) DEVICE — GLOVE PROTEXIS PI SYN SURG 7.5

## (undated) DEVICE — SLIPPER FALL PREV YEL BARIAT

## (undated) DEVICE — CATH IV INTROCAN 22G X 1

## (undated) DEVICE — GLOVE SENSICARE PI ALOE 8

## (undated) DEVICE — KIT IV START RUSH

## (undated) DEVICE — SOL CONTINU-FLO SET 2 LAV

## (undated) DEVICE — GLOVE PROTEXIS PI SYN SURG 6.5

## (undated) DEVICE — SET EXT STD BORE CATH 7.6IN